# Patient Record
Sex: MALE
[De-identification: names, ages, dates, MRNs, and addresses within clinical notes are randomized per-mention and may not be internally consistent; named-entity substitution may affect disease eponyms.]

---

## 2020-04-13 PROBLEM — Z00.00 ENCOUNTER FOR PREVENTIVE HEALTH EXAMINATION: Status: ACTIVE | Noted: 2020-04-13

## 2020-04-30 ENCOUNTER — NON-APPOINTMENT (OUTPATIENT)
Age: 55
End: 2020-04-30

## 2020-04-30 ENCOUNTER — APPOINTMENT (OUTPATIENT)
Age: 55
End: 2020-04-30
Payer: MEDICARE

## 2020-04-30 PROCEDURE — 99201 OFFICE OUTPATIENT NEW 10 MINUTES: CPT | Mod: 95

## 2020-05-04 ENCOUNTER — APPOINTMENT (OUTPATIENT)
Dept: ENDOCRINOLOGY | Facility: CLINIC | Age: 55
End: 2020-05-04
Payer: MEDICARE

## 2020-05-04 VITALS
DIASTOLIC BLOOD PRESSURE: 77 MMHG | WEIGHT: 221 LBS | HEART RATE: 93 BPM | HEIGHT: 74 IN | BODY MASS INDEX: 28.36 KG/M2 | SYSTOLIC BLOOD PRESSURE: 132 MMHG

## 2020-05-04 DIAGNOSIS — R74.8 ABNORMAL LEVELS OF OTHER SERUM ENZYMES: ICD-10-CM

## 2020-05-04 DIAGNOSIS — R23.2 FLUSHING: ICD-10-CM

## 2020-05-04 PROCEDURE — 36415 COLL VENOUS BLD VENIPUNCTURE: CPT

## 2020-05-04 PROCEDURE — 99205 OFFICE O/P NEW HI 60 MIN: CPT | Mod: 25

## 2020-05-07 ENCOUNTER — LABORATORY RESULT (OUTPATIENT)
Age: 55
End: 2020-05-07

## 2020-05-08 PROBLEM — R74.8 ALKALINE PHOSPHATASE ELEVATION: Status: ACTIVE | Noted: 2020-05-08

## 2020-05-08 NOTE — HISTORY OF PRESENT ILLNESS
[FreeTextEntry1] : 54 years old male with history of schizoaffective disorders, depression, anxiety,insomnia, HIV +, and chronic back pain-Laminectomy April 2015\par He was referred by his PCP Hugo Borrego because of elevation of alkaline phosphatase. \par Reviewed patient's chart:  Low normal free testosterone,mild elevation of s.creat, high normal alk phosphatase liver isoenzimes, with normal ALT, high TG with low HDL, and abdominal u/s c/u with fatty liver disease and normal BMD\par \par Patient stated that his main concern is hot flushes that began ~ 18 months ago and recently brought it  to the attention of PCP who recommend an endo appt.\par Hot flushes  is increasing in frequency and duration as the time pass,it occur few times a day  ,and usually start in the upper part of the body radiate to his face and forehead, with no headache, palpitations, neither skin color changes,\par He denies MVA ,testicular trauma, use of etoh,testosterone, amphetamine\par Social : Raised at foster home, lives alone, smoke a 1/2 pack of cigarettes a day\par Sedentary lifestyle, and recently have gained weight.\par Denies frequent headaches visual disturbances and breast discharged, No libido\par Medicines:Biktarvy, Lyrica, Zoloft, Seroquel, Lamictal, Ambien and Ativan\par \par 7/5/19 abd u/s c/w Liver fatty infiltration\par 1/14/2020, Alk phos 187 ( ) ALT 24\par  3/9/20 total testosterone 148.5 ( 193-740 ) , H/H: 13.9/41.8\par \par \par \par \par

## 2020-05-08 NOTE — REASON FOR VISIT
[Initial Evaluation] : an initial evaluation [Hypogonadism] : hypogonadism [Other___] : [unfilled] [FreeTextEntry2] : Hugo Farley

## 2020-05-08 NOTE — PHYSICAL EXAM
[Alert] : alert [Normal Sclera/Conjunctiva] : normal sclera/conjunctiva [Normal Outer Ear/Nose] : the ears and nose were normal in appearance [No Neck Mass] : no neck mass was observed [No Thyroid Nodules] : no palpable thyroid nodules [No Respiratory Distress] : no respiratory distress [Normal PMI] : the apical impulse was normal [Carotids Normal] : carotid pulses were normal with no bruits [Normal Bowel Sounds] : normal bowel sounds [No CVA Tenderness] : no ~M costovertebral angle tenderness [No Stigmata of Cushings Syndrome] : no stigmata of Cushings Syndrome [Normal Gait] : normal gait [No Rash] : no rash [No Skin Lesions] : no skin lesions [Normal Reflexes] : deep tendon reflexes were 2+ and symmetric [Oriented x3] : oriented to person, place, and time

## 2020-05-17 LAB
ALBUMIN SERPL ELPH-MCNC: 4.4 G/DL
ALKPISO INTERP: NORMAL
ALP BLD-CCNC: 150 U/L
ALP BLD-CCNC: 178 U/L
ALP BONE CFR SERPL: 29 %
ALP INTEST CFR SERPL: 2 %
ALP LIVER CFR SERPL: 69 %
ALP MACRO CFR SERPL: 0 %
ALP PLAC CFR SERPL: 0 %
ALT SERPL-CCNC: 26 U/L
ANION GAP SERPL CALC-SCNC: 18 MMOL/L
AST SERPL-CCNC: 22 U/L
BASOPHILS # BLD AUTO: 0.07 K/UL
BASOPHILS NFR BLD AUTO: 0.9 %
BILIRUB SERPL-MCNC: 0.2 MG/DL
BUN SERPL-MCNC: 17 MG/DL
CALCIUM SERPL-MCNC: 9.6 MG/DL
CHLORIDE SERPL-SCNC: 105 MMOL/L
CO2 SERPL-SCNC: 24 MMOL/L
CREAT SERPL-MCNC: 1.28 MG/DL
EOSINOPHIL # BLD AUTO: 0.24 K/UL
EOSINOPHIL NFR BLD AUTO: 3.1 %
ESTRADIOL SERPL-MCNC: 14 PG/ML
FOLATE SERPL-MCNC: 19 NG/ML
FSH SERPL-MCNC: 5.4 IU/L
FSH SERPL-MCNC: 5.8 IU/L
GGT SERPL-CCNC: 28 U/L
GLUCOSE SERPL-MCNC: 91 MG/DL
HCT VFR BLD CALC: 49.6 %
HGB BLD-MCNC: 15.5 G/DL
IMM GRANULOCYTES NFR BLD AUTO: 0.3 %
LH SERPL-ACNC: 3.2 IU/L
LH SERPL-ACNC: 4.1 IU/L
LYMPHOCYTES # BLD AUTO: 2.79 K/UL
LYMPHOCYTES NFR BLD AUTO: 36.6 %
MAN DIFF?: NORMAL
MCHC RBC-ENTMCNC: 31.3 GM/DL
MCHC RBC-ENTMCNC: 31.5 PG
MCV RBC AUTO: 100.8 FL
MONOCYTES # BLD AUTO: 0.91 K/UL
MONOCYTES NFR BLD AUTO: 11.9 %
NEUTROPHILS # BLD AUTO: 3.6 K/UL
NEUTROPHILS NFR BLD AUTO: 47.2 %
PLATELET # BLD AUTO: 330 K/UL
POTASSIUM SERPL-SCNC: 4.7 MMOL/L
PROLACTIN SERPL-MCNC: 7.8 NG/ML
PROT SERPL-MCNC: 7.2 G/DL
RBC # BLD: 4.92 M/UL
RBC # FLD: 14.5 %
SODIUM SERPL-SCNC: 147 MMOL/L
T3FREE SERPL-MCNC: 2.7 PG/ML
T4 FREE SERPL-MCNC: 0.7 NG/DL
TESTOST BND SERPL-MCNC: 6.3 PG/ML
TESTOST BND SERPL-MCNC: 8.6 PG/ML
TESTOST SERPL-MCNC: 289.3 NG/DL
TESTOST SERPL-MCNC: 289.9 NG/DL
TSH SERPL-ACNC: 1.62 UIU/ML
VIT B12 SERPL-MCNC: 431 PG/ML
WBC # FLD AUTO: 7.63 K/UL

## 2020-05-19 ENCOUNTER — APPOINTMENT (OUTPATIENT)
Dept: ENDOCRINOLOGY | Facility: CLINIC | Age: 55
End: 2020-05-19
Payer: MEDICARE

## 2020-05-19 VITALS
BODY MASS INDEX: 28.25 KG/M2 | SYSTOLIC BLOOD PRESSURE: 130 MMHG | HEART RATE: 85 BPM | WEIGHT: 220 LBS | DIASTOLIC BLOOD PRESSURE: 81 MMHG

## 2020-05-19 DIAGNOSIS — R79.89 OTHER SPECIFIED ABNORMAL FINDINGS OF BLOOD CHEMISTRY: ICD-10-CM

## 2020-05-19 PROCEDURE — 99214 OFFICE O/P EST MOD 30 MIN: CPT | Mod: 25

## 2020-05-19 PROCEDURE — 36415 COLL VENOUS BLD VENIPUNCTURE: CPT

## 2020-05-21 PROBLEM — R79.89 LOW TESTOSTERONE LEVEL IN MALE: Status: ACTIVE | Noted: 2020-05-04

## 2020-05-21 NOTE — HISTORY OF PRESENT ILLNESS
[FreeTextEntry1] : 53 y/o M pt, with Hx of low normal testosterone levels. \par Other PMHx: Schizoaffective Disorder, Depression, Anxiety, Insomnia, HIV+, s/p Laminectomy on 4/2015 for chronic back pain\par Reviewed pt's chart: Low normal free testosterone, mild elevation of s.creat, high normal ALK PHOS liver isoenzymes with normal ALT, high TG with low HDL. Abdominal US c/w with fatty liver disease. Normal BMD.\par Social : Raised at foster home, lives alone, smoke a 1/2 pack of cigarettes a day\par Sedentary lifestyle, and recently have gained weight.\par \par 5/4/20\par Patient stated that his main concern is hot flushes that began ~ 18 months ago and recently brought it to the attention of PCP who recommend an endo appt.\par Hot flushes is increasing in frequency and duration as the time pass,it occur few times a day ,and usually start in the upper part of the body radiate to his face and forehead, with no headache, palpitations, neither skin color changes,\par He denies MVA ,testicular trauma, use of etoh,testosterone, amphetamine\par Denies frequent headaches visual disturbances and breast discharged, No libido\par \par 05/19/2020\par Pt presents today for endocrine f/u, feeling fatigued with c/o hot flashes and absence of libido. His Seroquel has been increased from 200 mg to 300 mg recently.\par Denies headache, weight changes, visual disturbances, and fever. \par \par Current Medications: Biktarvy, Lyrica, Zoloft, Seroquel 300, Lamictal, Ambien and Ativan\par \par Most recent lab results:\par - 5/7/20: Free Testosterone 6.3, Total Testosterone 289.3, FSH 5.8, LH 4.1\par - 5/2/20: TSH 1.62, Free T4 0.7, Free T3 2.7, Free Testosterone 8.6, Total Testosterone 289.9, Estradiol 14, FSH 5.4, LH 3.2, Prolactin 7.8, ALK PHOS 178\par \par Previous lab results:\par - 7/5/19 Abdominal US: Enlarged echogenic liver compatible with fatly infiltration. \par - 1/14/20: ALK PHOS 187 (),  ALT 24\par - 3/9/20: Total Testosterone 148.5 (193-740), H/H 13.9/41.8

## 2020-05-21 NOTE — ASSESSMENT
[FreeTextEntry1] : 54 year male with:\par \par 1. Low normal testosterone level with LH between 3-4 and FSH ~5; no headache and occasional hot flashes. Patient anti-psychotic medication recently increased. Do not recommend hormonal supplementation\par  Pt was given my contact information to further discuss this with his PCP and psychiatrist. \par \par 2. Slightly decreased level of Free T4 ,0.7 on 5/2/20 and 0.8 on May 19 ( 0.9-1.8) with normal TSH:\par     Recommend repeat  TFT in 6 months\par \par 3. Hot flushes probable related to anti-psychotic meds\par

## 2020-05-21 NOTE — ADDENDUM
[FreeTextEntry1] : I, Adryan Lemus, acted solely as a scribe for Dr. Filipe Childress on this date. 05/19/2020.

## 2020-05-21 NOTE — REVIEW OF SYSTEMS
[Fatigue] : fatigue [Decreased Libido] : decreased libido [Hot Flashes] : hot flashes [Negative] : Psychiatric [Recent Weight Gain (___ Lbs)] : no recent weight gain [Recent Weight Loss (___ Lbs)] : no recent weight loss [Visual Field Defect] : no visual field defect [Fever] : no fever [Headaches] : no headaches

## 2020-05-21 NOTE — END OF VISIT
[Time Spent: ___ minutes] : I have spent [unfilled] minutes of time on the encounter. [FreeTextEntry3] : All medical record entries made by the Scribe were at my, Dr. Filipe Childress, direction and personally dictated by me on 05/19/2020. I have reviewed the chart and agree that the record accurately reflects my personal performance of the history, physical exam, assessment and plan. I have also personally directed, reviewed and agreed with the chart.

## 2020-06-08 LAB
BASOPHILS # BLD AUTO: 0.06 K/UL
BASOPHILS NFR BLD AUTO: 0.9 %
EOSINOPHIL # BLD AUTO: 0.15 K/UL
EOSINOPHIL NFR BLD AUTO: 2.2 %
HCT VFR BLD CALC: 48.4 %
HGB BLD-MCNC: 15.4 G/DL
IMM GRANULOCYTES NFR BLD AUTO: 0.1 %
LYMPHOCYTES # BLD AUTO: 2.45 K/UL
LYMPHOCYTES NFR BLD AUTO: 35.6 %
MAN DIFF?: NORMAL
MCHC RBC-ENTMCNC: 31.4 PG
MCHC RBC-ENTMCNC: 31.8 GM/DL
MCV RBC AUTO: 98.8 FL
MONOCYTES # BLD AUTO: 0.79 K/UL
MONOCYTES NFR BLD AUTO: 11.5 %
NEUTROPHILS # BLD AUTO: 3.43 K/UL
NEUTROPHILS NFR BLD AUTO: 49.7 %
PLATELET # BLD AUTO: 332 K/UL
PROLACTIN SERPL-MCNC: 7.1 NG/ML
RBC # BLD: 4.9 M/UL
RBC # FLD: 13.8 %
T3FREE SERPL-MCNC: 2.91 PG/ML
T4 FREE SERPL-MCNC: 0.8 NG/DL
TSH SERPL-ACNC: 1 UIU/ML
WBC # FLD AUTO: 6.89 K/UL

## 2020-06-25 ENCOUNTER — APPOINTMENT (OUTPATIENT)
Dept: OPHTHALMOLOGY | Facility: CLINIC | Age: 55
End: 2020-06-25

## 2020-09-15 ENCOUNTER — APPOINTMENT (OUTPATIENT)
Dept: ENDOCRINOLOGY | Facility: CLINIC | Age: 55
End: 2020-09-15